# Patient Record
Sex: FEMALE | Race: OTHER | HISPANIC OR LATINO | Employment: PART TIME | URBAN - METROPOLITAN AREA
[De-identification: names, ages, dates, MRNs, and addresses within clinical notes are randomized per-mention and may not be internally consistent; named-entity substitution may affect disease eponyms.]

---

## 2024-10-27 ENCOUNTER — HOSPITAL ENCOUNTER (OUTPATIENT)
Facility: HOSPITAL | Age: 34
Discharge: HOME | End: 2024-10-27
Attending: EMERGENCY MEDICINE | Admitting: EMERGENCY MEDICINE
Payer: MEDICAID

## 2024-10-27 VITALS
RESPIRATION RATE: 20 BRPM | OXYGEN SATURATION: 100 % | WEIGHT: 93 LBS | HEIGHT: 60 IN | HEART RATE: 98 BPM | TEMPERATURE: 98.6 F | SYSTOLIC BLOOD PRESSURE: 104 MMHG | BODY MASS INDEX: 18.26 KG/M2 | DIASTOLIC BLOOD PRESSURE: 76 MMHG

## 2024-10-27 DIAGNOSIS — J20.8 VIRAL BRONCHITIS: Primary | ICD-10-CM

## 2024-10-27 LAB
FLUAV SUBTYP SPEC NAA+PROBE: NOT DETECTED
FLUBV RNA ISLT QL NAA+PROBE: NOT DETECTED
SARS-COV-2 RNA RESP QL NAA+PROBE: NOT DETECTED

## 2024-10-27 PROCEDURE — 87636 SARSCOV2 & INF A&B AMP PRB: CPT | Performed by: EMERGENCY MEDICINE

## 2024-10-27 PROCEDURE — G0463 HOSPITAL OUTPT CLINIC VISIT: HCPCS | Performed by: PHYSICIAN ASSISTANT

## 2024-10-27 RX ORDER — ALBUTEROL SULFATE 90 UG/1
2 INHALANT RESPIRATORY (INHALATION) EVERY 4 HOURS PRN
Qty: 8 G | Refills: 0 | Status: SHIPPED | OUTPATIENT
Start: 2024-10-27

## 2024-10-27 RX ORDER — DEXTROMETHORPHAN HYDROBROMIDE AND PROMETHAZINE HYDROCHLORIDE 15; 6.25 MG/5ML; MG/5ML
5 SYRUP ORAL 4 TIMES DAILY PRN
Qty: 180 ML | Refills: 0 | Status: SHIPPED | OUTPATIENT
Start: 2024-10-27

## 2024-10-27 RX ORDER — METHYLPREDNISOLONE 4 MG/1
TABLET ORAL
Qty: 21 TABLET | Refills: 0 | Status: SHIPPED | OUTPATIENT
Start: 2024-10-27

## 2024-10-27 NOTE — [facility] PROVIDER NOTE
EMERGENCY DEPARTMENT ENCOUNTER    Room Number:  09/09  Date seen:  10/27/2024  Time seen: 15:25 EDT  PCP: Provider, No Known  Historian: Patient    Discussed/obtained information from independent historians: N/A    HPI:  Chief complaint: Cough and congestion  A complete HPI/ROS/PMH/PSH/SH/FH are unobtainable due to: Nothing  Context:Curt Richardson is a 34 y.o. female who presents to the ED with c/o cough and congestion.  Patient reports she was recently exposed to the rhinovirus.  Cough is said to be worse at night.  No PMH of asthma.  no nausea vomiting diarrhea.  No unilateral leg swelling, pedal edema, SOA.  No chest pain or palpitations.  Patient is here for further evaluation.    External (non-ED) record review: Reviewing the patient's chart she had a normal spontaneous vaginal delivery 3/15/2021.  Child was delivered by Dr Jennings.    Chronic or social conditions impacting care:    ALLERGIES  Patient has no known allergies.    PAST MEDICAL HISTORY  Active Ambulatory Problems     Diagnosis Date Noted    No Active Ambulatory Problems     Resolved Ambulatory Problems     Diagnosis Date Noted    No Resolved Ambulatory Problems     No Additional Past Medical History       PAST SURGICAL HISTORY  No past surgical history on file.    FAMILY HISTORY  Family History   Problem Relation Age of Onset    Hypertension Mother     Hypertension Father     Hyperlipidemia Father        SOCIAL HISTORY  Social History     Socioeconomic History    Marital status:    Tobacco Use    Smoking status: Never   Substance and Sexual Activity    Alcohol use: No    Drug use: No       REVIEW OF SYSTEMS  Review of Systems    All systems reviewed and negative except for those discussed in HPI.     PHYSICAL EXAM    I have reviewed the triage vital signs and nursing notes.  Vitals:    10/27/24 1516   BP:    Pulse:    Resp:    Temp: 98.6 °F (37 °C)   SpO2:      Physical Exam    GENERAL: WDWN female, not distressed  HENT: nares  patent.  Mild nasal mucosal edema.  Oropharynx and TMs unremarkable.  EYES: no scleral icterus  NECK: no ROM limitations  CV: regular rhythm, regular rate  RESPIRATORY: normal effort, no wheezes rales or rhonchi.  SpO2 99%.  ABDOMEN: soft  : deferred  MUSCULOSKELETAL: no deformity  NEURO: alert, moves all extremities, follows commands  SKIN: warm, dry    LAB RESULTS  Recent Results (from the past 24 hours)   COVID-19 and FLU A/B PCR, 1 HR TAT - Swab, Nasopharynx    Collection Time: 10/27/24  3:16 PM    Specimen: Nasopharynx; Swab   Result Value Ref Range    COVID19 Not Detected Not Detected - Ref. Range    Influenza A PCR Not Detected Not Detected    Influenza B PCR Not Detected Not Detected       Ordered the above labs and independently interpreted results.  My findings will be discussed in the ED course or medical decision making section below    RADIOLOGY RESULTS  No Radiology Exams Resulted Within Past 24 Hours     Ordered the above noted radiological studies.  Independently interpreted by me.  My findings will be discussed in the medical decision section below.     PROGRESS, DATA ANALYSIS, CONSULTS AND MEDICAL DECISION MAKING    Please note that this section constitutes my independent interpretation of clinical data including lab results, radiology, EKG's.  This constitutes my independent professional opinion regarding differential diagnosis and management of this patient.  It may include any factors such as history from outside sources, review of external records, social determinants of health, management of medications, response to those treatments, and discussions with other providers.    ED Course as of 10/27/24 1607   Sun Oct 27, 2024   1537 BP: 142/79 [RC]   1537 Temp: 98.6 °F (37 °C) [RC]   1537 Resp: 20 [RC]   1537 SpO2: 99 %  RA [RC]   1551 COVID19: Not Detected [RC]   1551 Influenza A PCR: Not Detected [RC]   1551 Influenza B PCR: Not Detected [RC]   1604 The lungs are CTAB.  Offered chest x-ray and  this was declined.  She states symptoms are consistent with her friends that currently have the rhinovirus.  Will treat as a viral bronchitis. [RC]      ED Course User Index  [RC] Barak Gaviria III, PA     Orders placed during this visit:  Orders Placed This Encounter   Procedures    COVID-19 and FLU A/B PCR, 1 HR TAT - Swab, Nasopharynx            Medical Decision Making    COVID, flu, rhinovirus, other viral illness.  Clinically no indication for an x-ray at this time.  COVID and flu test were obtained in triage.  If negative will treat accordingly.      DIAGNOSIS  Final diagnoses:   Viral bronchitis          Medication List        New Prescriptions      albuterol sulfate  (90 Base) MCG/ACT inhaler  Commonly known as: PROVENTIL HFA;VENTOLIN HFA;PROAIR HFA  Inhale 2 puffs Every 4 (Four) Hours As Needed for Shortness of Air.     methylPREDNISolone 4 MG dose pack  Commonly known as: MEDROL  Take as directed on package instructions.     promethazine-dextromethorphan 6.25-15 MG/5ML syrup  Commonly known as: PROMETHAZINE-DM  Take 5 mL by mouth 4 (Four) Times a Day As Needed for Cough.               Where to Get Your Medications        These medications were sent to [facility] [place] [address] - [place], [place] - [address] - [phone]  - [phone]   [address] [facility] [address]      Phone: [phone]   albuterol sulfate  (90 Base) MCG/ACT inhaler  methylPREDNISolone 4 MG dose pack  promethazine-dextromethorphan 6.25-15 MG/5ML syrup         FOLLOW-UP  No follow-up provider specified.      Latest Documented Vital Signs:  As of 16:07 EDT  BP- 142/79 HR- 98 Temp- 98.6 °F (37 °C) (Oral) O2 sat- 99%    Appropriate PPE utilized throughout this patient encounter to include mask, if indicated, per current protocol. Hand hygiene was performed before donning PPE and after removal when leaving the room.    Please note that portions of this were completed with a voice  recognition program.     Note Disclaimer: At [facility], we believe that sharing information builds trust and better relationships. You are receiving this note because you are receiving care at [facility] or recently visited. It is possible you will see health information before a provider has talked with you about it. This kind of information can be easy to misunderstand. To help you fully understand what it means for your health, we urge you to discuss this note with your provider.

## 2024-10-27 NOTE — Clinical Note
[facility]  [address]  [place] [id number]    Curt Richardson was seen and treated in our emergency department on 10/27/2024.  She may return to work on 10/29/2024.         Thank you for choosing [facility].    Barak Gaviria III, PA